# Patient Record
Sex: MALE | Race: WHITE | NOT HISPANIC OR LATINO | URBAN - METROPOLITAN AREA
[De-identification: names, ages, dates, MRNs, and addresses within clinical notes are randomized per-mention and may not be internally consistent; named-entity substitution may affect disease eponyms.]

---

## 2017-06-16 ENCOUNTER — OFFICE (OUTPATIENT)
Dept: URBAN - METROPOLITAN AREA CLINIC 45 | Facility: CLINIC | Age: 67
End: 2017-06-16

## 2017-06-16 VITALS
SYSTOLIC BLOOD PRESSURE: 125 MMHG | HEIGHT: 69 IN | WEIGHT: 252 LBS | DIASTOLIC BLOOD PRESSURE: 72 MMHG | HEART RATE: 85 BPM

## 2017-06-16 DIAGNOSIS — E66.9 OBESITY: ICD-10-CM

## 2017-06-16 DIAGNOSIS — R14.0: ICD-10-CM

## 2017-06-16 DIAGNOSIS — K75.81 NONALCOHOLIC STEATOHEPATITIS (NASH): ICD-10-CM

## 2017-06-16 DIAGNOSIS — K21.9 GASTROESOPHAGEAL REFLUX DISEASE: ICD-10-CM

## 2017-06-16 DIAGNOSIS — E11.43 DIABETIC GASTROPARESIS: ICD-10-CM

## 2017-06-16 PROCEDURE — 99214 OFFICE O/P EST MOD 30 MIN: CPT | Performed by: INTERNAL MEDICINE

## 2017-06-16 RX ORDER — PANCRELIPASE 24000; 76000; 120000 [USP'U]/1; [USP'U]/1; [USP'U]/1
48 CAPSULE, DELAYED RELEASE PELLETS ORAL
Qty: 60 | Status: COMPLETED
End: 2023-07-06

## 2017-06-16 NOTE — SERVICEHPINOTES
The patient returns for follow-up of long-term epigastric region abdominal fullness and pain, intermittent excessive belching and frequent postprandial and nocturnal nausea.  We have considered he most likely etiology to be presence of diabetic gastroparesis, documented by upper endoscopy in 2016 the patient had evidence of solid food bezoars in the stomach.  He also has long-standing GERD and likely functional dyspepsia. Since last visit the patient states the symptoms have periodically persisted. Medication classes tried so far include PPI therapy and Reglan. The patient also reports abdominal bloating/distension and occasional foul-smelling belching. Ongoing alarm symptoms: none.  The patient has been previously maintained on daily PPI, that reportedly relieved most of his heartburn.  He has been intermittently taking small doses of Reglan, mainly to control intermittent nausea.  He has also been started on Creon a few years ago by another gastroenterologist in order to minimize postprandial bloating-not specifically to treat pancreatic insufficiency.  He has subsequently been taking 24,000 units of pancreatic lipase 3 times daily and reports some improvement of those symptoms.

## 2019-05-08 ENCOUNTER — HOSPITAL ENCOUNTER (OUTPATIENT)
Dept: HOSPITAL 91 - CCL | Age: 69
Discharge: HOME | End: 2019-05-08
Payer: MEDICARE

## 2019-05-08 ENCOUNTER — HOSPITAL ENCOUNTER (OUTPATIENT)
Dept: HOSPITAL 10 - CCL | Age: 69
Discharge: HOME | End: 2019-05-08
Attending: INTERNAL MEDICINE
Payer: MEDICARE

## 2019-05-08 VITALS — RESPIRATION RATE: 18 BRPM | DIASTOLIC BLOOD PRESSURE: 87 MMHG | SYSTOLIC BLOOD PRESSURE: 140 MMHG | HEART RATE: 81 BPM

## 2019-05-08 VITALS — HEART RATE: 74 BPM | RESPIRATION RATE: 17 BRPM | SYSTOLIC BLOOD PRESSURE: 126 MMHG | DIASTOLIC BLOOD PRESSURE: 83 MMHG

## 2019-05-08 VITALS — SYSTOLIC BLOOD PRESSURE: 128 MMHG | HEART RATE: 78 BPM | RESPIRATION RATE: 15 BRPM | DIASTOLIC BLOOD PRESSURE: 78 MMHG

## 2019-05-08 VITALS — DIASTOLIC BLOOD PRESSURE: 82 MMHG | RESPIRATION RATE: 17 BRPM | SYSTOLIC BLOOD PRESSURE: 129 MMHG | HEART RATE: 70 BPM

## 2019-05-08 VITALS — SYSTOLIC BLOOD PRESSURE: 126 MMHG | DIASTOLIC BLOOD PRESSURE: 88 MMHG | HEART RATE: 78 BPM | RESPIRATION RATE: 23 BRPM

## 2019-05-08 VITALS
HEIGHT: 68 IN | WEIGHT: 228.62 LBS | HEIGHT: 68 IN | BODY MASS INDEX: 34.65 KG/M2 | BODY MASS INDEX: 34.65 KG/M2 | WEIGHT: 228.62 LBS

## 2019-05-08 VITALS — DIASTOLIC BLOOD PRESSURE: 83 MMHG | SYSTOLIC BLOOD PRESSURE: 133 MMHG | RESPIRATION RATE: 16 BRPM | HEART RATE: 72 BPM

## 2019-05-08 VITALS — HEART RATE: 74 BPM | RESPIRATION RATE: 14 BRPM | SYSTOLIC BLOOD PRESSURE: 121 MMHG | DIASTOLIC BLOOD PRESSURE: 85 MMHG

## 2019-05-08 VITALS — RESPIRATION RATE: 19 BRPM | SYSTOLIC BLOOD PRESSURE: 134 MMHG | DIASTOLIC BLOOD PRESSURE: 83 MMHG | HEART RATE: 76 BPM

## 2019-05-08 VITALS — SYSTOLIC BLOOD PRESSURE: 130 MMHG | HEART RATE: 74 BPM | RESPIRATION RATE: 18 BRPM | DIASTOLIC BLOOD PRESSURE: 82 MMHG

## 2019-05-08 VITALS — SYSTOLIC BLOOD PRESSURE: 133 MMHG | RESPIRATION RATE: 13 BRPM | DIASTOLIC BLOOD PRESSURE: 85 MMHG | HEART RATE: 72 BPM

## 2019-05-08 VITALS — SYSTOLIC BLOOD PRESSURE: 119 MMHG | DIASTOLIC BLOOD PRESSURE: 82 MMHG | RESPIRATION RATE: 17 BRPM | HEART RATE: 76 BPM

## 2019-05-08 VITALS — HEART RATE: 79 BPM | SYSTOLIC BLOOD PRESSURE: 139 MMHG | RESPIRATION RATE: 16 BRPM | DIASTOLIC BLOOD PRESSURE: 86 MMHG

## 2019-05-08 VITALS — HEART RATE: 80 BPM | SYSTOLIC BLOOD PRESSURE: 122 MMHG | RESPIRATION RATE: 20 BRPM | DIASTOLIC BLOOD PRESSURE: 78 MMHG

## 2019-05-08 VITALS — RESPIRATION RATE: 17 BRPM | HEART RATE: 74 BPM | DIASTOLIC BLOOD PRESSURE: 83 MMHG | SYSTOLIC BLOOD PRESSURE: 133 MMHG

## 2019-05-08 VITALS — SYSTOLIC BLOOD PRESSURE: 131 MMHG | DIASTOLIC BLOOD PRESSURE: 89 MMHG | RESPIRATION RATE: 18 BRPM | HEART RATE: 70 BPM

## 2019-05-08 VITALS — DIASTOLIC BLOOD PRESSURE: 87 MMHG | HEART RATE: 74 BPM | SYSTOLIC BLOOD PRESSURE: 131 MMHG | RESPIRATION RATE: 19 BRPM

## 2019-05-08 VITALS — HEART RATE: 79 BPM | DIASTOLIC BLOOD PRESSURE: 78 MMHG | RESPIRATION RATE: 20 BRPM | SYSTOLIC BLOOD PRESSURE: 120 MMHG

## 2019-05-08 VITALS — HEART RATE: 74 BPM | RESPIRATION RATE: 18 BRPM | SYSTOLIC BLOOD PRESSURE: 135 MMHG | DIASTOLIC BLOOD PRESSURE: 82 MMHG

## 2019-05-08 VITALS — DIASTOLIC BLOOD PRESSURE: 84 MMHG | RESPIRATION RATE: 15 BRPM | SYSTOLIC BLOOD PRESSURE: 133 MMHG | HEART RATE: 72 BPM

## 2019-05-08 VITALS — SYSTOLIC BLOOD PRESSURE: 130 MMHG | HEART RATE: 74 BPM | DIASTOLIC BLOOD PRESSURE: 80 MMHG | RESPIRATION RATE: 15 BRPM

## 2019-05-08 DIAGNOSIS — E78.5: ICD-10-CM

## 2019-05-08 DIAGNOSIS — I10: ICD-10-CM

## 2019-05-08 DIAGNOSIS — I34.0: ICD-10-CM

## 2019-05-08 DIAGNOSIS — I25.10: Primary | ICD-10-CM

## 2019-05-08 LAB
ADD MAN DIFF?: NO
ANION GAP: 9 (ref 5–13)
BASOPHIL #: 0.1 10^3/UL (ref 0–0.1)
BASOPHILS %: 0.6 % (ref 0–2)
BLOOD UREA NITROGEN: 23 MG/DL (ref 7–20)
CALCIUM: 9.1 MG/DL (ref 8.4–10.2)
CARBON DIOXIDE: 29 MMOL/L (ref 21–31)
CHLORIDE: 104 MMOL/L (ref 97–110)
CHOL/HDL RATIO: 4.9 RATIO
CHOLESTEROL: 169 MG/DL (ref 100–200)
CREATININE: 1.12 MG/DL (ref 0.61–1.24)
EOSINOPHILS #: 0.6 10^3/UL (ref 0–0.5)
EOSINOPHILS %: 6.3 % (ref 0–7)
GLUCOSE: 110 MG/DL (ref 70–220)
HDL CHOLESTEROL: 34 MG/DL (ref 30–78)
HEMATOCRIT: 47.2 % (ref 42–52)
HEMOGLOBIN: 15.2 G/DL (ref 14–18)
IMMATURE GRANS #M: 0.03 10^3/UL (ref 0–0.03)
IMMATURE GRANS % (M): 0.3 % (ref 0–0.43)
INR: 0.93
LDL CHOLESTEROL,CALCULATED: 106 MG/DL
LYMPHOCYTES #: 3.8 10^3/UL (ref 0.8–2.9)
LYMPHOCYTES %: 40 % (ref 15–51)
MEAN CORPUSCULAR HEMOGLOBIN: 28.4 PG (ref 29–33)
MEAN CORPUSCULAR HGB CONC: 32.2 G/DL (ref 32–37)
MEAN CORPUSCULAR VOLUME: 88.1 FL (ref 82–101)
MEAN PLATELET VOLUME: 11.7 FL (ref 7.4–10.4)
MONOCYTE #: 1 10^3/UL (ref 0.3–0.9)
MONOCYTES %: 10.1 % (ref 0–11)
NEUTROPHIL #: 4.1 10^3/UL (ref 1.6–7.5)
NEUTROPHILS %: 42.7 % (ref 39–77)
NUCLEATED RED BLOOD CELLS #: 0 10^3/UL (ref 0–0)
NUCLEATED RED BLOOD CELLS%: 0 /100WBC (ref 0–0)
PARTIAL THROMBOPLASTIN TIME: 25.2 SEC (ref 23–35)
PLATELET COUNT: 157 10^3/UL (ref 140–415)
POTASSIUM: 4.8 MMOL/L (ref 3.5–5.1)
PROTIME: 12.6 SEC (ref 11.9–14.9)
PT RATIO: 1
RED BLOOD COUNT: 5.36 10^6/UL (ref 4.7–6.1)
RED CELL DISTRIBUTION WIDTH: 14.6 % (ref 11.5–14.5)
SODIUM: 142 MMOL/L (ref 135–144)
TRIGLYCERIDES: 145 MG/DL (ref 0–149)
WHITE BLOOD COUNT: 9.6 10^3/UL (ref 4.8–10.8)

## 2019-05-08 PROCEDURE — 85730 THROMBOPLASTIN TIME PARTIAL: CPT

## 2019-05-08 PROCEDURE — 93458 L HRT ARTERY/VENTRICLE ANGIO: CPT

## 2019-05-08 PROCEDURE — 85610 PROTHROMBIN TIME: CPT

## 2019-05-08 PROCEDURE — 85025 COMPLETE CBC W/AUTO DIFF WBC: CPT

## 2019-05-08 PROCEDURE — 80048 BASIC METABOLIC PNL TOTAL CA: CPT

## 2019-05-08 PROCEDURE — 71045 X-RAY EXAM CHEST 1 VIEW: CPT

## 2019-05-08 PROCEDURE — 82962 GLUCOSE BLOOD TEST: CPT

## 2019-05-08 PROCEDURE — 93005 ELECTROCARDIOGRAM TRACING: CPT

## 2019-05-08 PROCEDURE — C1887 CATHETER, GUIDING: HCPCS

## 2019-05-08 PROCEDURE — 80061 LIPID PANEL: CPT

## 2019-05-08 RX ADMIN — THIAMINE HYDROCHLORIDE 1 MLS/HR: 100 INJECTION, SOLUTION INTRAMUSCULAR; INTRAVENOUS at 12:40

## 2019-05-08 RX ADMIN — FAMOTIDINE 1 MG: 20 TABLET ORAL at 10:31

## 2019-05-08 RX ADMIN — DIPHENHYDRAMINE HYDROCHLORIDE 1 MG: 50 CAPSULE ORAL at 10:31

## 2019-05-08 RX ADMIN — ACETAMINOPHEN 1 MG: 325 TABLET, FILM COATED ORAL at 13:52

## 2019-05-08 RX ADMIN — DIAZEPAM 1 MG: 5 TABLET ORAL at 10:31

## 2019-05-23 ENCOUNTER — OFFICE (OUTPATIENT)
Dept: URBAN - METROPOLITAN AREA CLINIC 45 | Facility: CLINIC | Age: 69
End: 2019-05-23

## 2019-05-23 VITALS
WEIGHT: 237 LBS | DIASTOLIC BLOOD PRESSURE: 75 MMHG | SYSTOLIC BLOOD PRESSURE: 124 MMHG | HEIGHT: 69 IN | HEART RATE: 81 BPM

## 2019-05-23 DIAGNOSIS — L40.52 PSORIATIC ARTHRITIS: ICD-10-CM

## 2019-05-23 DIAGNOSIS — E66.9 OBESITY: ICD-10-CM

## 2019-05-23 DIAGNOSIS — R94.5: ICD-10-CM

## 2019-05-23 DIAGNOSIS — R07.89: ICD-10-CM

## 2019-05-23 DIAGNOSIS — E11.43 DIABETIC GASTROPARESIS: ICD-10-CM

## 2019-05-23 DIAGNOSIS — Z86.010 PERSONAL HISTORY OF COLONIC POLYPS: ICD-10-CM

## 2019-05-23 PROCEDURE — 99215 OFFICE O/P EST HI 40 MIN: CPT | Performed by: INTERNAL MEDICINE

## 2019-05-23 RX ORDER — POLYETHYLENE GLYCOL 3350 17 G/17G
17 POWDER, FOR SOLUTION ORAL
Qty: 1 | Status: COMPLETED
Start: 2019-05-23 | End: 2019-06-26

## 2019-05-23 NOTE — SERVICEHPINOTES
The patient complains of experiencing recent lower chest discomfort that was classified as noncardiac but he has previously experienced frequent epigastric region abdominal fullness and pain, intermittent excessive belching and nausea. We have considered he most likely etiology to be presence of GERD or diabetic gastroparesis, which was documented by upper endoscopy in 2016. The patient then had retained solid food bezoars in the stomach. Medication classes tried so far include PPI therapy and intermittent use of Reglan. The patient has been taking maintenance daily PPI since his last visit in 2017. He has been intermittently taking small doses of Reglan, mainly to control intermittent nausea. He has also been started on Creon a few years ago by another gastroenterologist in order to minimize postprandial bloating-not specifically to treat pancreatic insufficiency. The patient has been able to lose almost 20 pounds of weight diet recommended by nutritionist.  He reportedly had unremarkable cardiac treadmill stress test earlier this year. Patient reportedly had a couple of benign colon polyps removed in 2013, exam was limited by poor preparation. Subsequent repeat colonoscopy performed in 2014 only demonstrated internal hemorrhoids and diverticulosis.

## 2019-06-14 ENCOUNTER — AMBULATORY SURGICAL CENTER (OUTPATIENT)
Dept: URBAN - METROPOLITAN AREA SURGERY 28 | Facility: SURGERY | Age: 69
End: 2019-06-14

## 2019-06-14 VITALS
DIASTOLIC BLOOD PRESSURE: 90 MMHG | HEART RATE: 78 BPM | WEIGHT: 230 LBS | RESPIRATION RATE: 10 BRPM | HEIGHT: 69 IN | OXYGEN SATURATION: 97 % | TEMPERATURE: 97.5 F | SYSTOLIC BLOOD PRESSURE: 152 MMHG

## 2019-06-14 DIAGNOSIS — K29.70 GASTRITIS, UNSPECIFIED, WITHOUT BLEEDING: ICD-10-CM

## 2019-06-14 DIAGNOSIS — K31.89 OTHER DISEASES OF STOMACH AND DUODENUM: ICD-10-CM

## 2019-06-14 DIAGNOSIS — K21.9 GASTRO-ESOPHAGEAL REFLUX DISEASE WITHOUT ESOPHAGITIS: ICD-10-CM

## 2019-06-14 DIAGNOSIS — R07.89 OTHER CHEST PAIN: ICD-10-CM

## 2019-06-14 DIAGNOSIS — K25.9 GASTRIC ULCER, UNSP AS ACUTE OR CHRONIC, W/O HEMOR OR PERF: ICD-10-CM

## 2019-06-14 LAB — SURGICAL: PDF REPORT: (no result)

## 2019-06-14 PROCEDURE — 43239 EGD BIOPSY SINGLE/MULTIPLE: CPT | Performed by: INTERNAL MEDICINE

## 2019-06-17 ENCOUNTER — OFFICE (OUTPATIENT)
Dept: URBAN - METROPOLITAN AREA CLINIC 45 | Facility: CLINIC | Age: 69
End: 2019-06-17

## 2019-06-17 VITALS — HEIGHT: 69 IN

## 2019-06-17 DIAGNOSIS — K74.0 HEPATIC FIBROSIS: ICD-10-CM

## 2019-06-17 PROCEDURE — 91200 LIVER ELASTOGRAPHY: CPT | Performed by: INTERNAL MEDICINE

## 2019-06-18 PROBLEM — K76.0 FATTY (CHANGE OF) LIVER, NOT ELSEWHERE CLASSIFIED: Status: ACTIVE | Noted: 2019-06-17

## 2019-06-26 ENCOUNTER — OFFICE (OUTPATIENT)
Dept: URBAN - METROPOLITAN AREA CLINIC 45 | Facility: CLINIC | Age: 69
End: 2019-06-26

## 2019-06-26 VITALS
WEIGHT: 230 LBS | HEIGHT: 69 IN | SYSTOLIC BLOOD PRESSURE: 131 MMHG | HEART RATE: 73 BPM | DIASTOLIC BLOOD PRESSURE: 86 MMHG

## 2019-06-26 DIAGNOSIS — R94.5: ICD-10-CM

## 2019-06-26 DIAGNOSIS — R14.0: ICD-10-CM

## 2019-06-26 DIAGNOSIS — Z86.010 PERSONAL HISTORY OF COLONIC POLYPS: ICD-10-CM

## 2019-06-26 DIAGNOSIS — K74.0 HEPATIC FIBROSIS: ICD-10-CM

## 2019-06-26 PROCEDURE — 99214 OFFICE O/P EST MOD 30 MIN: CPT | Performed by: INTERNAL MEDICINE

## 2019-06-26 RX ORDER — DEXLANSOPRAZOLE 60 MG/1
60 CAPSULE, DELAYED RELEASE ORAL
Qty: 30 | Status: COMPLETED
Start: 2019-06-26 | End: 2023-07-06

## 2019-06-26 RX ORDER — VITAMIN E (DL,TOCOPHERYL ACET) 180 MG
800 CAPSULE ORAL
Qty: 60 | Status: COMPLETED
Start: 2019-06-26 | End: 2023-07-06

## 2019-06-26 NOTE — SERVICEHPINOTES
The patient returns for follow-up after recent fibrocan and upper endoscopy. He continues to experience intermittent abdominal fullness/bloating, excessive belching, flatulence and nausea. EGD demonstrated several small raised erosions in the gastric antrum, biopsy showed nonspecific gastritis without H. pylori infection, no evidence of celiac sprue was found on duodenal biopsies.  The patient has been taking maintenance daily PPI Nexium since his last visit in 2017. He has been intermittently taking small doses of Reglan, mainly to control intermittent nausea. He has also been started on Creon a few years ago by another gastroenterologist in order to minimize postprandial bloating-not specifically to treat pancreatic insufficiency. Patient reportedly had a couple of benign colon polyps removed in 2013, exam was limited by poor preparation. Subsequent repeat colonoscopy performed in 2014 only demonstrated internal hemorrhoids and diverticulosis.

## 2019-10-08 ENCOUNTER — OFFICE (OUTPATIENT)
Dept: URBAN - METROPOLITAN AREA CLINIC 45 | Facility: CLINIC | Age: 69
End: 2019-10-08

## 2019-10-08 VITALS
HEART RATE: 84 BPM | WEIGHT: 224 LBS | HEIGHT: 69 IN | SYSTOLIC BLOOD PRESSURE: 130 MMHG | DIASTOLIC BLOOD PRESSURE: 79 MMHG

## 2019-10-08 DIAGNOSIS — R94.5: ICD-10-CM

## 2019-10-08 DIAGNOSIS — R14.0: ICD-10-CM

## 2019-10-08 DIAGNOSIS — K76.0 STEATOSIS OF LIVER: ICD-10-CM

## 2019-10-08 DIAGNOSIS — K74.0 HEPATIC FIBROSIS: ICD-10-CM

## 2019-10-08 DIAGNOSIS — R19.4 CHANGE IN BOWEL HABITS: ICD-10-CM

## 2019-10-08 DIAGNOSIS — E66.9 OBESITY: ICD-10-CM

## 2019-10-08 DIAGNOSIS — E11.43 DIABETIC GASTROPARESIS: ICD-10-CM

## 2019-10-08 PROCEDURE — 99214 OFFICE O/P EST MOD 30 MIN: CPT | Performed by: INTERNAL MEDICINE

## 2019-10-08 NOTE — SERVICEHPINOTES
The patient complains of recent change in bowel habits. He reports having frequent and often fragmented formed or soft bowel movements, often associated with increased straining. He continues to experience intermittent abdominal fullness/bloating, flatulence and occasional nausea. Prior EGD demonstrated several erosions in the gastric antrum, biopsy showed nonspecific gastritis, no evidence of H. pylori infection or celiac sprue. The patient has discontinued taking PPI Nexium and reports no recent recurrence of heartburns.  He has changed his diet to avoid salads. He has been intermittently taking small doses of Reglan, mainly to control intermittent nausea. He has been using Creon to minimize postprandial bloatin. Patient had a couple of benign colon polyps removed in 2013, exam was limited by poor preparation. Subsequent repeat colonoscopy performed in 2014 only demonstrated internal hemorrhoids and diverticulosis. Patient reportedly had unremarkable cardiac workup, including angiogram several months ago.

## 2019-11-08 ENCOUNTER — AMBULATORY SURGICAL CENTER (OUTPATIENT)
Dept: URBAN - METROPOLITAN AREA SURGERY 28 | Facility: SURGERY | Age: 69
End: 2019-11-08

## 2019-11-08 VITALS
HEART RATE: 86 BPM | SYSTOLIC BLOOD PRESSURE: 156 MMHG | OXYGEN SATURATION: 99 % | DIASTOLIC BLOOD PRESSURE: 91 MMHG | WEIGHT: 224 LBS | HEIGHT: 69 IN | RESPIRATION RATE: 16 BRPM | TEMPERATURE: 97.5 F

## 2019-11-08 DIAGNOSIS — R19.4 CHANGE IN BOWEL HABIT: ICD-10-CM

## 2019-11-08 DIAGNOSIS — Z86.010 PERSONAL HISTORY OF COLONIC POLYPS: ICD-10-CM

## 2019-11-08 PROCEDURE — 45378 DIAGNOSTIC COLONOSCOPY: CPT | Performed by: INTERNAL MEDICINE

## 2019-11-08 NOTE — SERVICEHPINOTES
The patient complains of recent change in bowel habits. He reports having frequent and often fragmented formed or soft bowel movements, often associated with increased straining. He continues to experience intermittent abdominal fullness/bloating, flatulence and occasional nausea. Prior EGD demonstrated several erosions in the gastric antrum, biopsy showed nonspecific gastritis, no evidence of H. pylori infection or celiac sprue. The patient has discontinued taking PPI Nexium and reports no recent recurrence of heartburns. He has changed his diet to avoid salads. He has been intermittently taking small doses of Reglan, mainly to control intermittent nausea. He has been using Creon to minimize postprandial bloatin. Patient had a couple of benign colon polyps removed in 2013, exam was limited by poor preparation. Subsequent repeat colonoscopy performed in 2014 only demonstrated internal hemorrhoids and diverticulosis. Patient reportedly had unremarkable cardiac workup, including angiogram several months ago. 
No complaints

## 2023-07-06 ENCOUNTER — OFFICE (OUTPATIENT)
Dept: URBAN - METROPOLITAN AREA CLINIC 45 | Facility: CLINIC | Age: 73
End: 2023-07-06

## 2023-07-06 VITALS
HEART RATE: 80 BPM | HEIGHT: 69 IN | TEMPERATURE: 97.7 F | WEIGHT: 221 LBS | DIASTOLIC BLOOD PRESSURE: 74 MMHG | SYSTOLIC BLOOD PRESSURE: 131 MMHG

## 2023-07-06 DIAGNOSIS — K21.9 GASTROESOPHAGEAL REFLUX DISEASE: ICD-10-CM

## 2023-07-06 DIAGNOSIS — K75.81 NONALCOHOLIC STEATOHEPATITIS (NASH): ICD-10-CM

## 2023-07-06 DIAGNOSIS — D50.9 IRON DEFICIENCY ANEMIA, RULE OUT GI BLOOD LOSS: ICD-10-CM

## 2023-07-06 DIAGNOSIS — R14.0: ICD-10-CM

## 2023-07-06 PROCEDURE — 99204 OFFICE O/P NEW MOD 45 MIN: CPT | Performed by: INTERNAL MEDICINE

## 2023-07-31 ENCOUNTER — AMBULATORY SURGICAL CENTER (OUTPATIENT)
Dept: URBAN - METROPOLITAN AREA SURGERY 28 | Facility: SURGERY | Age: 73
End: 2023-07-31

## 2023-07-31 VITALS — HEIGHT: 69 IN

## 2023-07-31 DIAGNOSIS — K31.89 OTHER DISEASES OF STOMACH AND DUODENUM: ICD-10-CM

## 2023-07-31 DIAGNOSIS — D50.9 IRON DEFICIENCY ANEMIA, UNSPECIFIED: ICD-10-CM

## 2023-07-31 PROBLEM — K63.5 POLYP OF COLON: Status: ACTIVE | Noted: 2023-07-31

## 2023-07-31 LAB — SURGICAL: PDF REPORT: (no result)

## 2023-07-31 PROCEDURE — 45380 COLONOSCOPY AND BIOPSY: CPT | Mod: 59 | Performed by: INTERNAL MEDICINE

## 2023-07-31 PROCEDURE — 45385 COLONOSCOPY W/LESION REMOVAL: CPT | Performed by: INTERNAL MEDICINE

## 2023-07-31 PROCEDURE — 43239 EGD BIOPSY SINGLE/MULTIPLE: CPT | Performed by: INTERNAL MEDICINE

## 2023-08-09 ENCOUNTER — OFFICE (OUTPATIENT)
Dept: URBAN - METROPOLITAN AREA CLINIC 45 | Facility: CLINIC | Age: 73
End: 2023-08-09

## 2023-08-09 VITALS — HEIGHT: 69 IN

## 2023-08-09 DIAGNOSIS — K21.9 GASTROESOPHAGEAL REFLUX DISEASE, CONTROLLED BY PPI: ICD-10-CM

## 2023-08-09 DIAGNOSIS — Z86.010: ICD-10-CM

## 2023-08-09 DIAGNOSIS — L40.52 PSORIATIC ARTHRITIS: ICD-10-CM

## 2023-08-09 DIAGNOSIS — D50.9: ICD-10-CM

## 2023-08-09 PROCEDURE — 99213 OFFICE O/P EST LOW 20 MIN: CPT | Mod: 95 | Performed by: INTERNAL MEDICINE
